# Patient Record
Sex: FEMALE | Race: WHITE
[De-identification: names, ages, dates, MRNs, and addresses within clinical notes are randomized per-mention and may not be internally consistent; named-entity substitution may affect disease eponyms.]

---

## 2018-04-14 ENCOUNTER — HOSPITAL ENCOUNTER (EMERGENCY)
Dept: HOSPITAL 5 - ED | Age: 38
Discharge: HOME | End: 2018-04-14
Payer: SELF-PAY

## 2018-04-14 VITALS — DIASTOLIC BLOOD PRESSURE: 71 MMHG | SYSTOLIC BLOOD PRESSURE: 105 MMHG

## 2018-04-14 DIAGNOSIS — R00.2: ICD-10-CM

## 2018-04-14 DIAGNOSIS — R07.89: ICD-10-CM

## 2018-04-14 DIAGNOSIS — I47.1: Primary | ICD-10-CM

## 2018-04-14 DIAGNOSIS — R55: ICD-10-CM

## 2018-04-14 LAB
ALBUMIN SERPL-MCNC: 4.1 G/DL (ref 3.9–5)
ALT SERPL-CCNC: 9 UNITS/L (ref 7–56)
BASOPHILS # (AUTO): 0.1 K/MM3 (ref 0–0.1)
BASOPHILS NFR BLD AUTO: 0.9 % (ref 0–1.8)
BUN SERPL-MCNC: 13 MG/DL (ref 7–17)
BUN/CREAT SERPL: 22 %
CALCIUM SERPL-MCNC: 8.9 MG/DL (ref 8.4–10.2)
EOSINOPHIL # BLD AUTO: 0.2 K/MM3 (ref 0–0.4)
EOSINOPHIL NFR BLD AUTO: 2.3 % (ref 0–4.3)
HCT VFR BLD CALC: 29.2 % (ref 30.3–42.9)
HEMOLYSIS INDEX: 2
HGB BLD-MCNC: 9.2 GM/DL (ref 10.1–14.3)
LYMPHOCYTES # BLD AUTO: 3.5 K/MM3 (ref 1.2–5.4)
LYMPHOCYTES NFR BLD AUTO: 52 % (ref 13.4–35)
MCH RBC QN AUTO: 23 PG (ref 28–32)
MCHC RBC AUTO-ENTMCNC: 31 % (ref 30–34)
MCV RBC AUTO: 72 FL (ref 79–97)
MONOCYTES # (AUTO): 0.5 K/MM3 (ref 0–0.8)
MONOCYTES % (AUTO): 7.7 % (ref 0–7.3)
PLATELET # BLD: 330 K/MM3 (ref 140–440)
RBC # BLD AUTO: 4.08 M/MM3 (ref 3.65–5.03)

## 2018-04-14 PROCEDURE — 93010 ELECTROCARDIOGRAM REPORT: CPT

## 2018-04-14 PROCEDURE — 99284 EMERGENCY DEPT VISIT MOD MDM: CPT

## 2018-04-14 PROCEDURE — 80053 COMPREHEN METABOLIC PANEL: CPT

## 2018-04-14 PROCEDURE — 85025 COMPLETE CBC W/AUTO DIFF WBC: CPT

## 2018-04-14 PROCEDURE — 93005 ELECTROCARDIOGRAM TRACING: CPT

## 2018-04-14 PROCEDURE — 84484 ASSAY OF TROPONIN QUANT: CPT

## 2018-04-14 PROCEDURE — 36415 COLL VENOUS BLD VENIPUNCTURE: CPT

## 2018-04-14 NOTE — EMERGENCY DEPARTMENT REPORT
ED Palpitations HPI





- General


Chief Complaint: Arrhythmia/Palpitations


Stated Complaint: PALPITATIONS


Time Seen by Provider: 04/14/18 03:10


Source: family, EMS


Mode of arrival: Stretcher


Limitations: No Limitations





- History of Present Illness


Initial Comments: 





pt. said she was giving her daughetr a bath and suiddenly felt her heart 

racing. she also had some chest tightness of mild to mpoderate intensity. she 

takes a lot of caffeinated products. while she was in the ambulance her heart 

rate was in the 170's and then suddenly broke and when she got here she was 

normal sinus rhythm. according to EMS SHE WAS IN svt


MD Complaint: "heart racing"


-: Gradual


Context: occured during rest


Associated Symptoms: chest pain, near-syncope





- Related Data


 Previous Rx's











 Medication  Instructions  Recorded  Last Taken  Type


 


Ferrous Sulfate [Feosol 325 MG tab] 325 mg PO BID #60 tablet 03/14/16 Unknown Rx


 


Ibuprofen [Motrin 600 MG tab] 600 mg PO Q6H #30 tablet 03/14/16 Unknown Rx


 


oxyCODONE /ACETAMINOPHEN [Percocet 2 tab PO Q6HR PRN #30 tablet 03/14/16 

Unknown Rx





5/325]    











 Allergies











Allergy/AdvReac Type Severity Reaction Status Date / Time


 


No Known Allergies Allergy   Unverified 03/13/16 10:12














ED Review of Systems


ROS: 


Stated complaint: PALPITATIONS


Other details as noted in HPI





Comment: All other systems reviewed and negative





ED Past Medical Hx





- Past Medical History


Hx Hypertension: No


Hx Congestive Heart Failure: No


Hx Diabetes: No


Hx Deep Vein Thrombosis: No


Hx Renal Disease: No


Hx Sickle Cell Disease: No


Hx Seizures: No


Hx Asthma: No


Hx COPD: No


Hx HIV: No





- Social History


Smoking Status: Never Smoker


Substance Use Type: None





- Medications


Home Medications: 


 Home Medications











 Medication  Instructions  Recorded  Confirmed  Last Taken  Type


 


Ferrous Sulfate [Feosol 325 MG tab] 325 mg PO BID #60 tablet 03/14/16  Unknown 

Rx


 


Ibuprofen [Motrin 600 MG tab] 600 mg PO Q6H #30 tablet 03/14/16  Unknown Rx


 


oxyCODONE /ACETAMINOPHEN [Percocet 2 tab PO Q6HR PRN #30 tablet 03/14/16  

Unknown Rx





5/325]     














ED Physical Exam





- General


Limitations: No Limitations


General appearance: alert, in no apparent distress





- Head


Head exam: Present: atraumatic, normocephalic





- Eye


Eye exam: Present: normal appearance





- ENT


ENT exam: Present: mucous membranes moist





- Neck


Neck exam: Present: normal inspection





- Respiratory


Respiratory exam: Present: normal lung sounds bilaterally.  Absent: respiratory 

distress





- Cardiovascular


Cardiovascular Exam: Present: regular rate, normal rhythm.  Absent: systolic 

murmur, diastolic murmur, rubs, gallop





- GI/Abdominal


GI/Abdominal exam: Present: soft, normal bowel sounds.  Absent: tenderness





- Rectal


Rectal exam: Present: deferred





- Extremities Exam


Extremities exam: Present: normal inspection





- Back Exam


Back exam: Present: normal inspection





- Neurological Exam


Neurological exam: Present: alert, oriented X3





- Psychiatric


Psychiatric exam: Present: normal affect, normal mood





- Skin


Skin exam: Present: warm, dry, intact, normal color.  Absent: rash





ED Course





 Vital Signs











  04/14/18 04/14/18 04/14/18





  02:54 03:00 03:30


 


Temperature 98.7 F  


 


Pulse Rate 92 H 93 H 94 H


 


Respiratory 16 17 22





Rate   


 


Blood Pressure  104/73 105/71


 


Blood Pressure 108/76  





[Left]   


 


O2 Sat by Pulse 100 100 97





Oximetry   














ED Medical Decision Making





- Lab Data


Result diagrams: 


 04/14/18 03:30





 04/14/18 03:30





- EKG Data


-: EKG Interpreted by Me


EKG shows normal: sinus rhythm (rate of 93), axis (normal), intervals (normal), 

QRS complexes (normal), ST-T waves (normal)





- Radiology Data


Radiology results: report reviewed


Critical care attestation.: 


If time is entered above; I have spent that time in minutes in the direct care 

of this critically ill patient, excluding procedure time.








ED Disposition


Clinical Impression: 


 Supraventricular tachycardia





Disposition: DC-01 TO HOME OR SELFCARE


Is pt being admited?: No


Does the pt Need Aspirin: No


Condition: Stable


Instructions:  Supraventricular Tachycardia (ED)


Additional Instructions: 


decreased caffeinated beverages. also use vagal maneuvers that you were just 

thought if it happens again .return to ED if this recurs.follow up with your 

PCP in 3 days


Referrals: 


SHIMON HODGSON MD [Primary Care Provider] - 3-5 Days


Time of Disposition: 04:47


Print Language: ENGLISH

## 2020-12-29 ENCOUNTER — HOSPITAL ENCOUNTER (EMERGENCY)
Dept: HOSPITAL 5 - ED | Age: 40
Discharge: LEFT BEFORE DISCHARGE | End: 2020-12-29
Payer: SELF-PAY

## 2020-12-29 VITALS — DIASTOLIC BLOOD PRESSURE: 55 MMHG | SYSTOLIC BLOOD PRESSURE: 93 MMHG

## 2020-12-29 DIAGNOSIS — Z53.21: ICD-10-CM

## 2020-12-29 DIAGNOSIS — R00.0: Primary | ICD-10-CM

## 2020-12-29 PROCEDURE — 93005 ELECTROCARDIOGRAM TRACING: CPT
